# Patient Record
Sex: FEMALE | Race: WHITE | NOT HISPANIC OR LATINO | Employment: OTHER | URBAN - NONMETROPOLITAN AREA
[De-identification: names, ages, dates, MRNs, and addresses within clinical notes are randomized per-mention and may not be internally consistent; named-entity substitution may affect disease eponyms.]

---

## 2023-03-29 LAB
ALANINE AMINOTRANSFERASE (SGPT) (U/L) IN SER/PLAS: 18 U/L (ref 7–45)
ALBUMIN (G/DL) IN SER/PLAS: 4.6 G/DL (ref 3.4–5)
ALKALINE PHOSPHATASE (U/L) IN SER/PLAS: 123 U/L (ref 33–136)
ANION GAP IN SER/PLAS: 12 MMOL/L (ref 10–20)
ASPARTATE AMINOTRANSFERASE (SGOT) (U/L) IN SER/PLAS: 25 U/L (ref 9–39)
BASOPHILS (10*3/UL) IN BLOOD BY AUTOMATED COUNT: 0.03 X10E9/L (ref 0–0.1)
BASOPHILS/100 LEUKOCYTES IN BLOOD BY AUTOMATED COUNT: 0.4 % (ref 0–2)
BILIRUBIN TOTAL (MG/DL) IN SER/PLAS: 0.4 MG/DL (ref 0–1.2)
CALCIUM (MG/DL) IN SER/PLAS: 10.2 MG/DL (ref 8.6–10.3)
CARBON DIOXIDE, TOTAL (MMOL/L) IN SER/PLAS: 26 MMOL/L (ref 21–32)
CHLORIDE (MMOL/L) IN SER/PLAS: 106 MMOL/L (ref 98–107)
CREATININE (MG/DL) IN SER/PLAS: 0.95 MG/DL (ref 0.5–1.05)
EOSINOPHILS (10*3/UL) IN BLOOD BY AUTOMATED COUNT: 0.16 X10E9/L (ref 0–0.4)
EOSINOPHILS/100 LEUKOCYTES IN BLOOD BY AUTOMATED COUNT: 2.2 % (ref 0–6)
ERYTHROCYTE DISTRIBUTION WIDTH (RATIO) BY AUTOMATED COUNT: 12.7 % (ref 11.5–14.5)
ERYTHROCYTE MEAN CORPUSCULAR HEMOGLOBIN CONCENTRATION (G/DL) BY AUTOMATED: 32.7 G/DL (ref 32–36)
ERYTHROCYTE MEAN CORPUSCULAR VOLUME (FL) BY AUTOMATED COUNT: 91 FL (ref 80–100)
ERYTHROCYTES (10*6/UL) IN BLOOD BY AUTOMATED COUNT: 4.65 X10E12/L (ref 4–5.2)
GFR FEMALE: 61 ML/MIN/1.73M2
GLUCOSE (MG/DL) IN SER/PLAS: 82 MG/DL (ref 74–99)
HEMATOCRIT (%) IN BLOOD BY AUTOMATED COUNT: 42.2 % (ref 36–46)
HEMOGLOBIN (G/DL) IN BLOOD: 13.8 G/DL (ref 12–16)
IMMATURE GRANULOCYTES/100 LEUKOCYTES IN BLOOD BY AUTOMATED COUNT: 0.1 % (ref 0–0.9)
LEUKOCYTES (10*3/UL) IN BLOOD BY AUTOMATED COUNT: 7.4 X10E9/L (ref 4.4–11.3)
LYMPHOCYTES (10*3/UL) IN BLOOD BY AUTOMATED COUNT: 1.51 X10E9/L (ref 0.8–3)
LYMPHOCYTES/100 LEUKOCYTES IN BLOOD BY AUTOMATED COUNT: 20.3 % (ref 13–44)
MONOCYTES (10*3/UL) IN BLOOD BY AUTOMATED COUNT: 0.44 X10E9/L (ref 0.05–0.8)
MONOCYTES/100 LEUKOCYTES IN BLOOD BY AUTOMATED COUNT: 5.9 % (ref 2–10)
NEUTROPHILS (10*3/UL) IN BLOOD BY AUTOMATED COUNT: 5.28 X10E9/L (ref 1.6–5.5)
NEUTROPHILS/100 LEUKOCYTES IN BLOOD BY AUTOMATED COUNT: 71.1 % (ref 40–80)
PLATELETS (10*3/UL) IN BLOOD AUTOMATED COUNT: 153 X10E9/L (ref 150–450)
POTASSIUM (MMOL/L) IN SER/PLAS: 4.2 MMOL/L (ref 3.5–5.3)
PROTEIN TOTAL: 7.7 G/DL (ref 6.4–8.2)
SODIUM (MMOL/L) IN SER/PLAS: 140 MMOL/L (ref 136–145)
UREA NITROGEN (MG/DL) IN SER/PLAS: 23 MG/DL (ref 6–23)

## 2023-03-30 LAB
APPEARANCE, URINE: NORMAL
ASCORBIC ACID: NORMAL MG/DL
BILIRUBIN, URINE: NORMAL
BLOOD, URINE: NORMAL
COLOR, URINE: NORMAL
GLUCOSE, URINE: NORMAL
KETONES, URINE: NORMAL
LEUKOCYTE ESTERASE, URINE: NORMAL
NITRITE, URINE: NORMAL
PH, URINE: NORMAL
PROTEIN, URINE: NORMAL
SPECIFIC GRAVITY, URINE: NORMAL
UROBILINOGEN, URINE: NORMAL

## 2023-08-07 LAB
ALANINE AMINOTRANSFERASE (SGPT) (U/L) IN SER/PLAS: NORMAL
ALBUMIN (G/DL) IN SER/PLAS: NORMAL
ALKALINE PHOSPHATASE (U/L) IN SER/PLAS: NORMAL
ANION GAP IN SER/PLAS: NORMAL
ASPARTATE AMINOTRANSFERASE (SGOT) (U/L) IN SER/PLAS: NORMAL
BASOPHILS (10*3/UL) IN BLOOD BY AUTOMATED COUNT: NORMAL
BASOPHILS/100 LEUKOCYTES IN BLOOD BY AUTOMATED COUNT: NORMAL
BILIRUBIN TOTAL (MG/DL) IN SER/PLAS: NORMAL
CALCIUM (MG/DL) IN SER/PLAS: NORMAL
CARBON DIOXIDE, TOTAL (MMOL/L) IN SER/PLAS: NORMAL
CHLORIDE (MMOL/L) IN SER/PLAS: NORMAL
CREATININE (MG/DL) IN SER/PLAS: NORMAL
EOSINOPHILS (10*3/UL) IN BLOOD BY AUTOMATED COUNT: NORMAL
EOSINOPHILS/100 LEUKOCYTES IN BLOOD BY AUTOMATED COUNT: NORMAL
ERYTHROCYTE DISTRIBUTION WIDTH (RATIO) BY AUTOMATED COUNT: NORMAL
ERYTHROCYTE MEAN CORPUSCULAR HEMOGLOBIN CONCENTRATION (G/DL) BY AUTOMATED: NORMAL
ERYTHROCYTE MEAN CORPUSCULAR VOLUME (FL) BY AUTOMATED COUNT: NORMAL
ERYTHROCYTES (10*6/UL) IN BLOOD BY AUTOMATED COUNT: NORMAL
GFR FEMALE: NORMAL
GFR MALE: NORMAL
GLUCOSE (MG/DL) IN SER/PLAS: NORMAL
HEMATOCRIT (%) IN BLOOD BY AUTOMATED COUNT: NORMAL
HEMOGLOBIN (G/DL) IN BLOOD: NORMAL
IMMATURE GRANULOCYTES/100 LEUKOCYTES IN BLOOD BY AUTOMATED COUNT: NORMAL
LEUKOCYTES (10*3/UL) IN BLOOD BY AUTOMATED COUNT: NORMAL
LYMPHOCYTES (10*3/UL) IN BLOOD BY AUTOMATED COUNT: NORMAL
LYMPHOCYTES/100 LEUKOCYTES IN BLOOD BY AUTOMATED COUNT: NORMAL
MANUAL DIFFERENTIAL Y/N: NORMAL
MONOCYTES (10*3/UL) IN BLOOD BY AUTOMATED COUNT: NORMAL
MONOCYTES/100 LEUKOCYTES IN BLOOD BY AUTOMATED COUNT: NORMAL
NEUTROPHILS (10*3/UL) IN BLOOD BY AUTOMATED COUNT: NORMAL
NEUTROPHILS/100 LEUKOCYTES IN BLOOD BY AUTOMATED COUNT: NORMAL
PLATELETS (10*3/UL) IN BLOOD AUTOMATED COUNT: NORMAL
POTASSIUM (MMOL/L) IN SER/PLAS: NORMAL
PROTEIN TOTAL: NORMAL
SODIUM (MMOL/L) IN SER/PLAS: NORMAL
UREA NITROGEN (MG/DL) IN SER/PLAS: NORMAL

## 2023-10-04 ENCOUNTER — APPOINTMENT (OUTPATIENT)
Dept: PHYSICAL THERAPY | Facility: CLINIC | Age: 78
End: 2023-10-04
Payer: COMMERCIAL

## 2023-10-10 PROBLEM — R42 DYSEQUILIBRIUM: Status: ACTIVE | Noted: 2023-10-10

## 2023-10-10 PROBLEM — M25.819 SHOULDER IMPINGEMENT: Status: ACTIVE | Noted: 2023-10-10

## 2023-10-10 RX ORDER — SIMVASTATIN 10 MG/1
1 TABLET, FILM COATED ORAL NIGHTLY
COMMUNITY

## 2023-10-10 RX ORDER — LEVOTHYROXINE SODIUM 25 UG/1
1 TABLET ORAL DAILY
COMMUNITY

## 2023-10-10 RX ORDER — CLONAZEPAM 0.5 MG/1
1 TABLET ORAL 3 TIMES DAILY
COMMUNITY

## 2023-10-10 RX ORDER — SPIRONOLACTONE 25 MG
1 TABLET ORAL DAILY
COMMUNITY

## 2023-10-10 RX ORDER — TOPIRAMATE SPINKLE 25 MG/1
1 CAPSULE ORAL 2 TIMES DAILY
COMMUNITY

## 2023-10-10 RX ORDER — SERTRALINE HYDROCHLORIDE 100 MG/1
1 TABLET, FILM COATED ORAL DAILY
COMMUNITY

## 2023-10-10 RX ORDER — LATANOPROST 50 UG/ML
1 SOLUTION/ DROPS OPHTHALMIC NIGHTLY
COMMUNITY

## 2023-10-10 RX ORDER — SOTALOL HYDROCHLORIDE 80 MG/1
1 TABLET ORAL 2 TIMES DAILY
COMMUNITY

## 2023-10-10 RX ORDER — ARTIFICIAL TEARS 1; 2; 3 MG/ML; MG/ML; MG/ML
1 SOLUTION/ DROPS OPHTHALMIC 4 TIMES DAILY
COMMUNITY

## 2023-10-11 ENCOUNTER — TREATMENT (OUTPATIENT)
Dept: PHYSICAL THERAPY | Facility: CLINIC | Age: 78
End: 2023-10-11
Payer: COMMERCIAL

## 2023-10-11 DIAGNOSIS — M25.819 SHOULDER IMPINGEMENT: Primary | ICD-10-CM

## 2023-10-11 PROCEDURE — 97110 THERAPEUTIC EXERCISES: CPT | Mod: GP

## 2023-10-11 ASSESSMENT — PAIN SCALES - GENERAL: PAINLEVEL_OUTOF10: 0 - NO PAIN

## 2023-10-11 ASSESSMENT — PAIN - FUNCTIONAL ASSESSMENT: PAIN_FUNCTIONAL_ASSESSMENT: 0-10

## 2023-10-11 NOTE — PROGRESS NOTES
"Physical Therapy Treatment    Patient Name: Lorna Joaquin  MRN: [id number]  Today's Date: 10/11/2023  Time Calculation  Start Time: 1115  Stop Time: 1150  Time Calculation (min): 35 min    Current Problem  Problem List Items Addressed This Visit             ICD-10-CM    Shoulder impingement - Primary M25.819       Assessment:Patient identity confirmed today with name/.  See goals;  further PT intervention options were discussed in view of copay and indication for further exercise development;  good ADL /strength and AROM improvements noted;  full HEP recommendations have been completed      Plan: the patient agrees with discharge to ongoing HEP with progress achieved      Subjective: no pain right now.  I did have an incident of increased sx for 2 days-I tried to put up a curtain.    Precautions  Precautions  Precautions Comment: Fall Risk: low   Pertinent medical HX includes: imbalance; osteoporosis  begin with gentle ROM and progress as alf.    Pain  Pain Assessment: 0-10  Pain Score: 0 - No pain    Treatments:  Reviewed omgoing HEP and also wall slide with foam roll  NOT TODAY   wall slide with foam roll 2x10 1#  A+P ed X  UBE 2x90\" Lv1  stdg row purple 2x10 X  stdg B shldr ext 2x10 purple X  seated chest press 2x10 purple X  seated IR/ER 2x10 purple/teal X    alphabet x1 cycle 2# N  wall push-up x10      gradual stability exercises consistent with reduction of RUE numbness and tingling.    Provided today:. HEP handout.     OP EDUCATION:   Discussed ongoing sx management and indication for further PT intervention today    Goals:   1. Independent HEP to allow for 50% reduction in max ADL C/C sx ( 10/10) 10/10 max sx within the last 5 days; 10/11/23; NOT MET  2. 010 night time sx to allow for uninterrupted sleep x1wk ( ) 2-3wks  nights sleep interrupted; 10/11/23; PARTIALLY MET  3. Survey score improvement from 45% to 35% (QDI) 3-4wks 18% as of 10/11/23; MET  4. ROM increase to allow for improved ADL " reaching, dressing uppers (from 90 to 130 active elevation) 3-4wks notes ADL improvements; 115 active elevation today; 10/1/23; PARTIALLY MET  5. Strength increase to allow for improved ADL carrying, object handling (from gr4+ to gr5 R shldr) 3-4wks; notes ADL improvements;  gr5 R shldr IR/ER/abd; 10/11/23 MET

## 2023-11-06 ENCOUNTER — HOSPITAL ENCOUNTER (OUTPATIENT)
Dept: RADIOLOGY | Facility: HOSPITAL | Age: 78
Discharge: HOME | End: 2023-11-06
Payer: COMMERCIAL

## 2023-11-06 DIAGNOSIS — M25.819 OTHER SPECIFIED JOINT DISORDERS, UNSPECIFIED SHOULDER: ICD-10-CM

## 2023-11-06 DIAGNOSIS — S42.211D UNSPECIFIED DISPLACED FRACTURE OF SURGICAL NECK OF RIGHT HUMERUS, SUBSEQUENT ENCOUNTER FOR FRACTURE WITH ROUTINE HEALING: ICD-10-CM

## 2023-11-06 DIAGNOSIS — M25.511 PAIN IN RIGHT SHOULDER: ICD-10-CM

## 2023-11-06 PROCEDURE — 73020 X-RAY EXAM OF SHOULDER: CPT | Mod: RT

## 2023-11-06 PROCEDURE — 73020 X-RAY EXAM OF SHOULDER: CPT | Mod: RIGHT SIDE | Performed by: RADIOLOGY

## 2023-11-08 ENCOUNTER — OFFICE VISIT (OUTPATIENT)
Dept: ORTHOPEDIC SURGERY | Facility: CLINIC | Age: 78
End: 2023-11-08
Payer: COMMERCIAL

## 2023-11-08 DIAGNOSIS — M25.819 SHOULDER IMPINGEMENT: Primary | ICD-10-CM

## 2023-11-08 PROCEDURE — 1159F MED LIST DOCD IN RCRD: CPT | Performed by: NURSE PRACTITIONER

## 2023-11-08 PROCEDURE — 20611 DRAIN/INJ JOINT/BURSA W/US: CPT | Performed by: NURSE PRACTITIONER

## 2023-11-08 PROCEDURE — 1125F AMNT PAIN NOTED PAIN PRSNT: CPT | Performed by: NURSE PRACTITIONER

## 2023-11-08 PROCEDURE — 1160F RVW MEDS BY RX/DR IN RCRD: CPT | Performed by: NURSE PRACTITIONER

## 2023-11-08 PROCEDURE — 99214 OFFICE O/P EST MOD 30 MIN: CPT | Performed by: NURSE PRACTITIONER

## 2023-11-08 RX ORDER — ACETAMINOPHEN 500 MG
1000 TABLET ORAL 2 TIMES DAILY
COMMUNITY

## 2023-11-08 RX ORDER — CALCIUM CARBONATE 1250 MG/5ML
SUSPENSION ORAL
COMMUNITY

## 2023-11-08 RX ORDER — DICLOFENAC SODIUM 10 MG/G
2 GEL TOPICAL 4 TIMES DAILY PRN
Qty: 100 G | Refills: 1 | Status: SHIPPED | OUTPATIENT
Start: 2023-11-08

## 2023-11-08 RX ORDER — NAPROXEN SODIUM 220 MG/1
81 TABLET, FILM COATED ORAL
COMMUNITY

## 2023-11-08 RX ORDER — IBUPROFEN 600 MG/1
600 TABLET ORAL EVERY 6 HOURS PRN
COMMUNITY
Start: 2015-09-17

## 2023-11-08 RX ORDER — ALENDRONATE SODIUM 70 MG/1
70 TABLET ORAL
COMMUNITY
Start: 2023-04-05

## 2023-11-08 RX ORDER — TRIAMCINOLONE ACETONIDE 40 MG/ML
40 INJECTION, SUSPENSION INTRA-ARTICULAR; INTRAMUSCULAR
Status: COMPLETED | OUTPATIENT
Start: 2023-11-08 | End: 2023-11-08

## 2023-11-08 RX ORDER — CHOLECALCIFEROL (VITAMIN D3) 50 MCG
TABLET ORAL
COMMUNITY

## 2023-11-08 RX ORDER — CELECOXIB 200 MG/1
CAPSULE ORAL
COMMUNITY
Start: 2023-07-30

## 2023-11-08 RX ORDER — MELOXICAM 15 MG/1
15 TABLET ORAL DAILY
Qty: 30 TABLET | Refills: 1 | Status: SHIPPED | OUTPATIENT
Start: 2023-11-08 | End: 2024-01-02

## 2023-11-08 RX ADMIN — TRIAMCINOLONE ACETONIDE 40 MG: 40 INJECTION, SUSPENSION INTRA-ARTICULAR; INTRAMUSCULAR at 11:32

## 2023-11-08 ASSESSMENT — ENCOUNTER SYMPTOMS
ENDOCRINE NEGATIVE: 1
CONSTITUTIONAL NEGATIVE: 1
RESPIRATORY NEGATIVE: 1
CARDIOVASCULAR NEGATIVE: 1
HEMATOLOGIC/LYMPHATIC NEGATIVE: 1
ARTHRALGIAS: 1
NEUROLOGICAL NEGATIVE: 1
PSYCHIATRIC NEGATIVE: 1

## 2023-11-08 ASSESSMENT — PAIN SCALES - GENERAL: PAINLEVEL_OUTOF10: 2

## 2023-11-08 ASSESSMENT — PAIN DESCRIPTION - DESCRIPTORS: DESCRIPTORS: ACHING

## 2023-11-08 ASSESSMENT — PAIN - FUNCTIONAL ASSESSMENT: PAIN_FUNCTIONAL_ASSESSMENT: 0-10

## 2023-11-08 NOTE — PROGRESS NOTES
"Subjective    Patient ID: Lorna Joaquin is a 78 y.o. female.    Chief Complaint: Follow-up and Pain of the Right Shoulder       Right Shoulder   Lorna is a pleasant 78-year-old female presenting today for recheck of right shoulder pain.  Still has some pain, some days are good, other day s\"I sit in a chair and sit and cry.\"   Out of PT x 3 weeks, HEP every other day. No pain with HEP.   Scheduled 1 500 Tyl in am, noon 1 Celebrex, a second dose in the afternoon afternoon and bedtime tyl  On Celebrex since March, 2023.   Fx August 2022  Only relief so far was cortisone inj to shoulder that lasted \"quite a while.\" (8/2023) - lasted about 2 months  No topicals attempted  Had been cleaning kitchen and moving heavy dishes overhead and may have aggravated sx.    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    HENT: Negative.     Respiratory: Negative.     Cardiovascular: Negative.    Endocrine: Negative.    Musculoskeletal:  Positive for arthralgias.   Skin: Negative.    Neurological: Negative.    Hematological: Negative.    Psychiatric/Behavioral: Negative.         Objective   Ortho Exam    Image Results:  XR shoulder right 1 view  Narrative: Interpreted By:  Nic Faust,   STUDY:  XR SHOULDER RIGHT 1 VIEW; ;  11/6/2023 12:09 pm      INDICATION:  Signs/Symptoms: fx f/u  M25.511: Right shoulder pain S42.211D:  Fracture of surgical neck of right humerus with routine healing  M25.819: Shoulder impingement.      COMPARISON:  08/07/2020      ACCESSION NUMBER(S):  [id number]      ORDERING CLINICIAN:  SARAHI ORTEGA      FINDINGS:  RIGHT SHOULDER AP NEUTRAL:  A healed fracture of the surgical neck of the humerus is seen with  impaction and slight displacement. There is no change in position  from 08/07/2023. No further increase in callus formation.  Degenerative changes are seen at the AC joint. An old healed fracture  of the distal clavicle is present.      Impression: Stable " findings from the previous examination, with healed impacted  fracture of the surgical neck of the humerus.      MACRO:  None      Signed by: Nic Faust 11/7/2023 3:51 PM  Dictation workstation:   [id number]    Patient ID: Lorna Joaquin is a 78 y.o. female.    L Inj/Asp: R subacromial bursa on 11/8/2023 11:32 AM  Indications: pain and joint swelling  Details: 22 G needle, ultrasound-guided posterior approach  Medications: 40 mg triamcinolone acetonide 40 mg/mL  Outcome: tolerated well, no immediate complications    We discussed risk and benefits of cortisone injection, patient wishes to proceed via verbal consent.  Skin was prepped with Betadine, Vapocoolant spray and alcohol.  Direct visualization using high-frequency linear probe of ultrasound was utilized to administer the injection of 40 mg Kenalog, 3 cc 1% lidocaine and 3 cc of bupivacaine.  Patient tolerated injection well lidocaine suppression.  Images were saved under MRN number into the PACS system.     Procedure, treatment alternatives, risks and benefits explained, specific risks discussed. Consent was given by the patient. Immediately prior to procedure a time out was called to verify the correct patient, procedure, equipment, support staff and site/side marked as required. Patient was prepped and draped in the usual sterile fashion.     Note: Patient voiced concern with safety and care of spouse with dementia.  Patient states she is safe within her home environment.  She does have a  she has reached out to in the past.  Both the patient and son have verbalized concern of the spouse driving.  I identified multiple resources for the patient to contact her , her 's PCP or neurologist regarding concern for cognition.  Reassurance given.    Assessment/Plan   Encounter Diagnoses:  Problem List Items Addressed This Visit             ICD-10-CM    Shoulder impingement - Primary M25.819    Relevant Medications     diclofenac sodium (Voltaren) 1 % gel gel    meloxicam (Mobic) 15 mg tablet    Other Relevant Orders    Point of Care Ultrasound (Completed)    Follow Up In Orthopaedic Surgery    Shoulder impingement  We discussed symptom control with OTC Tylenol and topical pain relievers. Patient has been on prescription Celebrex with no significant improvements in symptoms since earlier this year.  Patient has approximately 2 weeks of tablets remaining.  After that, we are going to trial meloxicam once daily and to DC the Celebrex.  nstructed on 2 grams diclofenac gel 4 times daily.  Patient may also use heat or ice, whichever offers better relief.  Patient tolerated cortisone injection well with positive lidocaine suppression at today's visit.  I encouraged light activity today and gradually resume normal activities over the next several days.  Beginning in 1 week, I am providing shoulder OA exercises to begin and advance as tolerated.  Plan will be to follow-up here in approximately 3 to 4 weeks, sooner for changes or concerns.  Patient in agreement with plan of care.  This note was generated using Dragon software.  It may contain errors in wording, punctuation or spelling.

## 2023-11-08 NOTE — ASSESSMENT & PLAN NOTE
We discussed symptom control with OTC Tylenol and topical pain relievers. Patient has been on prescription Celebrex with no significant improvements in symptoms since earlier this year.  Patient has approximately 2 weeks of tablets remaining.  After that, we are going to trial meloxicam once daily and to DC the Celebrex.  nstructed on 2 grams diclofenac gel 4 times daily.  Patient may also use heat or ice, whichever offers better relief.  Patient tolerated cortisone injection well with positive lidocaine suppression at today's visit.  I encouraged light activity today and gradually resume normal activities over the next several days.  Beginning in 1 week, I am providing shoulder OA exercises to begin and advance as tolerated.  Plan will be to follow-up here in approximately 3 to 4 weeks, sooner for changes or concerns.  Patient in agreement with plan of care.  This note was generated using Dragon software.  It may contain errors in wording, punctuation or spelling.

## 2023-11-16 ENCOUNTER — TELEPHONE (OUTPATIENT)
Dept: ORTHOPEDIC SURGERY | Facility: CLINIC | Age: 78
End: 2023-11-16
Payer: COMMERCIAL

## 2023-11-16 NOTE — TELEPHONE ENCOUNTER
LAUREN WITH [facility] CALLED AND STATED THAT THE DICLOFENAC GEL THAT WAS SENT IN WAS NOT SPECIFIC ON THE DIRECTIONS FOR A HALF DOSE. SHE WOULD LIKE YOU TO CALL BACK WITH THE CORRECT DOSING. [phone] OPTION 2  REF# [id number].

## 2023-12-18 ENCOUNTER — APPOINTMENT (OUTPATIENT)
Dept: ORTHOPEDIC SURGERY | Facility: CLINIC | Age: 78
End: 2023-12-18
Payer: COMMERCIAL

## 2023-12-18 PROBLEM — E78.00 PURE HYPERCHOLESTEROLEMIA: Status: ACTIVE | Noted: 2017-04-17
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
## 2023-12-18 PROBLEM — R15.9 FECAL INCONTINENCE: Status: ACTIVE | Noted: 2023-03-29

## 2023-12-18 PROBLEM — I49.9 IRREGULAR HEARTBEAT: Status: ACTIVE | Noted: 2023-12-18

## 2023-12-18 PROBLEM — F32.A DEPRESSION: Status: ACTIVE | Noted: 2023-12-18

## 2023-12-18 PROBLEM — I50.9 CONGESTIVE HEART FAILURE (MULTI): Status: ACTIVE | Noted: 2022-01-17

## 2023-12-18 PROBLEM — K52.9 CHRONIC DIARRHEA: Status: ACTIVE | Noted: 2022-08-02

## 2023-12-18 PROBLEM — R63.4 WEIGHT LOSS: Status: ACTIVE | Noted: 2022-08-02

## 2023-12-18 PROBLEM — R29.6 RECURRENT FALLS: Status: ACTIVE | Noted: 2018-08-03

## 2023-12-18 PROBLEM — I10 HYPERTENSION: Status: ACTIVE | Noted: 2019-01-27

## 2023-12-18 PROBLEM — H74.03 TYMPANOSCLEROSIS OF BOTH EARS: Status: ACTIVE | Noted: 2018-06-07

## 2023-12-18 RX ORDER — PROPYLENE GLYCOL 0.06 MG/ML
1 EMULSION OPHTHALMIC
COMMUNITY

## 2023-12-18 RX ORDER — BUPROPION HYDROCHLORIDE 300 MG/1
TABLET ORAL
COMMUNITY
Start: 2023-11-09

## 2023-12-18 RX ORDER — MORPHINE 10 MG/ML
0.3 TINCTURE ORAL EVERY 6 HOURS PRN
COMMUNITY
Start: 2011-06-03

## 2023-12-19 ENCOUNTER — OFFICE VISIT (OUTPATIENT)
Dept: ORTHOPEDIC SURGERY | Facility: CLINIC | Age: 78
End: 2023-12-19
Payer: COMMERCIAL

## 2023-12-19 DIAGNOSIS — M25.819 SHOULDER IMPINGEMENT: ICD-10-CM

## 2023-12-19 PROCEDURE — 99213 OFFICE O/P EST LOW 20 MIN: CPT | Performed by: NURSE PRACTITIONER

## 2023-12-19 PROCEDURE — 1036F TOBACCO NON-USER: CPT | Performed by: NURSE PRACTITIONER

## 2023-12-19 PROCEDURE — 1160F RVW MEDS BY RX/DR IN RCRD: CPT | Performed by: NURSE PRACTITIONER

## 2023-12-19 PROCEDURE — 1126F AMNT PAIN NOTED NONE PRSNT: CPT | Performed by: NURSE PRACTITIONER

## 2023-12-19 PROCEDURE — 1159F MED LIST DOCD IN RCRD: CPT | Performed by: NURSE PRACTITIONER

## 2023-12-19 ASSESSMENT — PATIENT HEALTH QUESTIONNAIRE - PHQ9
2. FEELING DOWN, DEPRESSED OR HOPELESS: NOT AT ALL
SUM OF ALL RESPONSES TO PHQ9 QUESTIONS 1 AND 2: 0
1. LITTLE INTEREST OR PLEASURE IN DOING THINGS: NOT AT ALL

## 2023-12-19 ASSESSMENT — ENCOUNTER SYMPTOMS
PSYCHIATRIC NEGATIVE: 1
NEUROLOGICAL NEGATIVE: 1
CONSTITUTIONAL NEGATIVE: 1
ENDOCRINE NEGATIVE: 1
RESPIRATORY NEGATIVE: 1
CARDIOVASCULAR NEGATIVE: 1
HEMATOLOGIC/LYMPHATIC NEGATIVE: 1
ARTHRALGIAS: 1

## 2023-12-19 ASSESSMENT — PAIN - FUNCTIONAL ASSESSMENT: PAIN_FUNCTIONAL_ASSESSMENT: 0-10

## 2023-12-19 ASSESSMENT — PAIN SCALES - GENERAL: PAINLEVEL_OUTOF10: 0 - NO PAIN

## 2023-12-19 NOTE — ASSESSMENT & PLAN NOTE
We discussed symptom control with OTC Tylenol and topical pain relievers. Instructed on 2 grams diclofenac gel 4 times daily.  Patient may also use heat or ice, whichever offers better relief. Continue shoulder OA exercises to begin and advance as tolerated, try for most days of the week.  Plan will be to follow-up here on prn basis for sx flare, changes or concerns. Patient in agreement with plan of care.  This note was generated using Dragon software.  It may contain errors in wording, punctuation or spelling.     Note: F/U concern with safety and care of spouse with dementia.  Patient has been in contact with her  and son.  Spouse is due for cognitive testing next week including ability to safely drive.  Patient declines any safety concerns within her home and has safe resources.  Reassurance given.

## 2023-12-19 NOTE — PROGRESS NOTES
Subjective    Patient ID: Lorna Joaquin is a 78 y.o. female.    Chief Complaint: Pain and Follow-up of the Right Shoulder       Right Shoulder     Lorna is a pleasant 78-year-old female presenting today for recheck of right shoulder pain. Good releif after ariadna at last visit, big improvements in ROM and strength  Aggravated with overuse and prolonged baking activity (12 hrs) Improved after day of rest.   Has been able to resume most household chores and ADLs well.  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    HENT: Negative.     Respiratory: Negative.     Cardiovascular: Negative.    Endocrine: Negative.    Musculoskeletal:  Positive for arthralgias.   Skin: Negative.    Neurological: Negative.    Hematological: Negative.    Psychiatric/Behavioral: Negative.         Objective   Right Shoulder Exam     Tenderness   Right shoulder tenderness location: mild anterior upper arm with abduction.    Range of Motion   Active abduction:  120   External rotation:  40   Forward flexion:  170     Muscle Strength   Abduction: 4/5   External rotation: 4/5   Biceps: 5/5     Tests   Almendarez test: negative  Cross arm: positive  Drop arm: negative  Sulcus: absent    Other   Erythema: absent  Sensation: normal  Pulse: present    Comments:  Full ROM of distal joints with no sx aggravation, distal motor and sensory intact, cap refill at 2 seconds.          Image Results:  XR shoulder right 1 view  Narrative: Interpreted By:  Nic Faust,   STUDY:  XR SHOULDER RIGHT 1 VIEW; ;  11/6/2023 12:09 pm      INDICATION:  Signs/Symptoms: fx f/u  M25.511: Right shoulder pain S42.211D:  Fracture of surgical neck of right humerus with routine healing  M25.819: Shoulder impingement.      COMPARISON:  08/07/2020      ACCESSION NUMBER(S):  [id number]      ORDERING CLINICIAN:  SARAHI ORTEGA      FINDINGS:  RIGHT SHOULDER AP NEUTRAL:  A healed  fracture of the surgical neck of the humerus is seen with  impaction and slight displacement. There is no change in position  from 08/07/2023. No further increase in callus formation.  Degenerative changes are seen at the AC joint. An old healed fracture  of the distal clavicle is present.      Impression: Stable findings from the previous examination, with healed impacted  fracture of the surgical neck of the humerus.      MACRO:  None      Signed by: Nic Faust 11/7/2023 3:51 PM  Dictation workstation:   [id number]          Assessment/Plan   Encounter Diagnoses:  Problem List Items Addressed This Visit             ICD-10-CM    Shoulder impingement M25.819     Problem List Items Addressed This Visit             ICD-10-CM    Shoulder impingement M25.819     We discussed symptom control with OTC Tylenol and topical pain relievers. Instructed on 2 grams diclofenac gel 4 times daily.  Patient may also use heat or ice, whichever offers better relief. Continue shoulder OA exercises to begin and advance as tolerated, try for most days of the week.  Plan will be to follow-up here on prn basis for sx flare, changes or concerns. Patient in agreement with plan of care.  This note was generated using Dragon software.  It may contain errors in wording, punctuation or spelling.     Note: F/U concern with safety and care of spouse with dementia.  Patient has been in contact with her  and son.  Spouse is due for cognitive testing next week including ability to safely drive.  Patient declines any safety concerns within her home and has safe resources.  Reassurance given.          Relevant Orders    Follow Up In Orthopaedic Surgery

## 2024-07-18 ENCOUNTER — HOSPITAL ENCOUNTER (OUTPATIENT)
Dept: RADIOLOGY | Facility: CLINIC | Age: 79
Discharge: HOME | End: 2024-07-18
Payer: COMMERCIAL

## 2024-07-18 ENCOUNTER — APPOINTMENT (OUTPATIENT)
Dept: ORTHOPEDIC SURGERY | Facility: CLINIC | Age: 79
End: 2024-07-18
Payer: COMMERCIAL

## 2024-07-18 DIAGNOSIS — M25.512 LEFT SHOULDER PAIN, UNSPECIFIED CHRONICITY: ICD-10-CM

## 2024-07-18 DIAGNOSIS — M25.819 SHOULDER IMPINGEMENT: Primary | ICD-10-CM

## 2024-07-18 PROCEDURE — 1160F RVW MEDS BY RX/DR IN RCRD: CPT | Performed by: NURSE PRACTITIONER

## 2024-07-18 PROCEDURE — 73030 X-RAY EXAM OF SHOULDER: CPT | Mod: LT

## 2024-07-18 PROCEDURE — 99214 OFFICE O/P EST MOD 30 MIN: CPT | Performed by: NURSE PRACTITIONER

## 2024-07-18 PROCEDURE — 73030 X-RAY EXAM OF SHOULDER: CPT | Mod: LEFT SIDE | Performed by: RADIOLOGY

## 2024-07-18 PROCEDURE — 1159F MED LIST DOCD IN RCRD: CPT | Performed by: NURSE PRACTITIONER

## 2024-07-18 ASSESSMENT — PAIN SCALES - GENERAL: PAINLEVEL_OUTOF10: 10 - WORST POSSIBLE PAIN

## 2024-07-18 ASSESSMENT — ENCOUNTER SYMPTOMS
ENDOCRINE NEGATIVE: 1
HEMATOLOGIC/LYMPHATIC NEGATIVE: 1
PSYCHIATRIC NEGATIVE: 1
ARTHRALGIAS: 1
CONSTITUTIONAL NEGATIVE: 1
NEUROLOGICAL NEGATIVE: 1
RESPIRATORY NEGATIVE: 1
CARDIOVASCULAR NEGATIVE: 1

## 2024-07-18 ASSESSMENT — PAIN - FUNCTIONAL ASSESSMENT: PAIN_FUNCTIONAL_ASSESSMENT: 0-10

## 2024-07-18 ASSESSMENT — PAIN DESCRIPTION - DESCRIPTORS: DESCRIPTORS: BURNING;ACHING

## 2024-07-18 NOTE — PROGRESS NOTES
Subjective    Patient ID: Lorna Joaquin is a 79 y.o. female.    Chief Complaint:   Chief Complaint   Patient presents with    Left Shoulder - Pain        HPI    Lorna is a 79-year-old female presenting today for new problem visit of left shoulder pain.  Sx for 3-4 months. Sx started after frequent overhead use cleaning out kitchen cupboards in process of remodeling.   3-4 falls in last yr, none directly acting shoulder, however has caught self against walls several times as well in near falls.   Unable to sleep on L side, sx aggravated after sleeping  Takes 2 Tylenol twice daily, Celebrex and Fosamax. Voltaren gel once daily  RH dominant.  Hx R humerus surgical neck fx 2022.    Review of Systems   Constitutional: Negative.    HENT: Negative.     Respiratory: Negative.     Cardiovascular: Negative.    Endocrine: Negative.    Musculoskeletal:  Positive for arthralgias.   Skin: Negative.    Neurological: Negative.    Hematological: Negative.    Psychiatric/Behavioral: Negative.        Objective   Left Shoulder Exam     Tenderness   The patient is experiencing tenderness in the acromioclavicular joint and biceps tendon.    Range of Motion   Active abduction:  90   Forward flexion:  140     Tests   Impingement: positive    Other   Erythema: absent  Sensation: normal  Pulse: present     Comments:  Positive Neer's testing, positive Speed test.  Full ROM of distal joints with no sx aggravation; distal motor and sensory intact, cap refill at 2 seconds.            Image Results:  === 07/18/24 ===    XR SHOULDER 2+ VIEWS LEFT    - Impression -  1. Mild acromioclavicular joint osteoarthrosis. Otherwise,  unremarkable left shoulder radiographs.    MACRO:    None.    Signed by: Rufina Vega 7/19/2024 7:47 PM  Dictation workstation:   [id number]     Independent review of left shoulder x-rays completed during today's visit.  Positive mild to moderate AC joint degenerative changes noted.  No acute fracture or misalignment  noted.  Patient ID: Lorna Joaquin is a 79 y.o. female.    L Inj/Asp: L subacromial bursa on 7/25/2024 11:25 AM  Indications: pain and joint swelling  Details: 22 G needle, posterior approach  Medications: 40 mg triamcinolone acetonide 40 mg/mL  Outcome: tolerated well, no immediate complications    We discussed risk and benefits of cortisone injection, patient wishes to proceed via verbal consent.  Skin was prepped with Betadine, vapo coolant spray and alcohol.  Administered injection of 40 mg Kenalog, 3 cc 1% lidocaine and 3 cc of 0.25% bupivacaine.  Patient tolerated injection well with lidocaine suppression.  No active bleeding, bandage applied to site.   Procedure, treatment alternatives, risks and benefits explained, specific risks discussed. Consent was given by the patient. Immediately prior to procedure a time out was called to verify the correct patient, procedure, equipment, support staff and site/side marked as required. Patient was prepped and draped in the usual sterile fashion.         Assessment/Plan   Encounter Diagnoses:  Problem List Items Addressed This Visit             ICD-10-CM    Shoulder impingement - Primary M25.819     We discussed symptom control with OTC Tylenol and topical pain relievers.  NSAID as previously prescribed on as needed basis with food.  Instructed on 2 grams diclofenac gel 4 times daily.  Patient may also use heat or ice, whichever offers better relief.    I am providing rotator cuff exercises to begin in approximately 1 week and advance as tolerated.  Plan will be to follow-up here in approximately 6 to 8 weeks weeks, sooner for changes or concerns.   Declines order for formal PT at this time  Patient in agreement with plan of care.  This note was generated using Dragon software.  It may contain errors in wording, punctuation or spelling.          Relevant Orders    Follow Up In Orthopaedic Surgery     Other Visit Diagnoses         Codes    Left shoulder pain,  unspecified chronicity     M25.512    Relevant Orders    XR shoulder left 2+ views (Completed)    Referral to Primary Care

## 2024-07-25 PROCEDURE — 20610 DRAIN/INJ JOINT/BURSA W/O US: CPT | Performed by: NURSE PRACTITIONER

## 2024-07-25 RX ORDER — TRIAMCINOLONE ACETONIDE 40 MG/ML
40 INJECTION, SUSPENSION INTRA-ARTICULAR; INTRAMUSCULAR
Status: COMPLETED | OUTPATIENT
Start: 2024-07-25 | End: 2024-07-25

## 2024-07-25 NOTE — ASSESSMENT & PLAN NOTE
We discussed symptom control with OTC Tylenol and topical pain relievers.  NSAID as previously prescribed on as needed basis with food.  Instructed on 2 grams diclofenac gel 4 times daily.  Patient may also use heat or ice, whichever offers better relief.    I am providing rotator cuff exercises to begin in approximately 1 week and advance as tolerated.  Plan will be to follow-up here in approximately 6 to 8 weeks weeks, sooner for changes or concerns.   Declines order for formal PT at this time  Patient in agreement with plan of care.  This note was generated using Dragon software.  It may contain errors in wording, punctuation or spelling.

## 2024-08-29 ENCOUNTER — APPOINTMENT (OUTPATIENT)
Dept: ORTHOPEDIC SURGERY | Facility: CLINIC | Age: 79
End: 2024-08-29
Payer: COMMERCIAL

## 2024-09-12 ENCOUNTER — HOSPITAL ENCOUNTER (EMERGENCY)
Facility: HOSPITAL | Age: 79
Discharge: HOME | End: 2024-09-12
Payer: COMMERCIAL

## 2024-09-12 ENCOUNTER — APPOINTMENT (OUTPATIENT)
Dept: RADIOLOGY | Facility: HOSPITAL | Age: 79
End: 2024-09-12
Payer: COMMERCIAL

## 2024-09-12 ENCOUNTER — HOSPITAL ENCOUNTER (OUTPATIENT)
Dept: CARDIOLOGY | Facility: HOSPITAL | Age: 79
Discharge: HOME | End: 2024-09-12
Payer: COMMERCIAL

## 2024-09-12 ENCOUNTER — APPOINTMENT (OUTPATIENT)
Dept: CARDIOLOGY | Facility: HOSPITAL | Age: 79
End: 2024-09-12
Payer: COMMERCIAL

## 2024-09-12 VITALS
RESPIRATION RATE: 17 BRPM | HEART RATE: 75 BPM | TEMPERATURE: 97.8 F | BODY MASS INDEX: 19.46 KG/M2 | HEIGHT: 64 IN | WEIGHT: 114 LBS | DIASTOLIC BLOOD PRESSURE: 68 MMHG | OXYGEN SATURATION: 98 % | SYSTOLIC BLOOD PRESSURE: 150 MMHG

## 2024-09-12 DIAGNOSIS — S32.020A COMPRESSION FRACTURE OF L2 VERTEBRA, INITIAL ENCOUNTER (MULTI): ICD-10-CM

## 2024-09-12 DIAGNOSIS — R19.7 DIARRHEA, UNSPECIFIED TYPE: ICD-10-CM

## 2024-09-12 DIAGNOSIS — R10.84 GENERALIZED ABDOMINAL PAIN: Primary | ICD-10-CM

## 2024-09-12 DIAGNOSIS — K76.6 PORTAL HYPERTENSION (MULTI): ICD-10-CM

## 2024-09-12 LAB
ALBUMIN SERPL BCP-MCNC: 4.1 G/DL (ref 3.4–5)
ALP SERPL-CCNC: 114 U/L (ref 33–136)
ALT SERPL W P-5'-P-CCNC: 10 U/L (ref 7–45)
ANION GAP SERPL CALC-SCNC: 11 MMOL/L (ref 10–20)
APPEARANCE UR: CLEAR
APTT PPP: 33 SECONDS (ref 27–38)
AST SERPL W P-5'-P-CCNC: 17 U/L (ref 9–39)
BASOPHILS # BLD AUTO: 0.02 X10*3/UL (ref 0–0.1)
BASOPHILS NFR BLD AUTO: 0.3 %
BILIRUB SERPL-MCNC: 0.5 MG/DL (ref 0–1.2)
BILIRUB UR STRIP.AUTO-MCNC: NEGATIVE MG/DL
BUN SERPL-MCNC: 23 MG/DL (ref 6–23)
CALCIUM SERPL-MCNC: 9.3 MG/DL (ref 8.6–10.3)
CARDIAC TROPONIN I PNL SERPL HS: 8 NG/L (ref 0–13)
CARDIAC TROPONIN I PNL SERPL HS: 8 NG/L (ref 0–13)
CHLORIDE SERPL-SCNC: 109 MMOL/L (ref 98–107)
CO2 SERPL-SCNC: 24 MMOL/L (ref 21–32)
COLOR UR: ABNORMAL
CREAT SERPL-MCNC: 1.03 MG/DL (ref 0.5–1.05)
EGFRCR SERPLBLD CKD-EPI 2021: 55 ML/MIN/1.73M*2
EOSINOPHIL # BLD AUTO: 0.25 X10*3/UL (ref 0–0.4)
EOSINOPHIL NFR BLD AUTO: 4.3 %
ERYTHROCYTE [DISTWIDTH] IN BLOOD BY AUTOMATED COUNT: 13.2 % (ref 11.5–14.5)
GLUCOSE SERPL-MCNC: 93 MG/DL (ref 74–99)
GLUCOSE UR STRIP.AUTO-MCNC: NORMAL MG/DL
HCT VFR BLD AUTO: 35.1 % (ref 36–46)
HGB BLD-MCNC: 11.3 G/DL (ref 12–16)
IMM GRANULOCYTES # BLD AUTO: 0.01 X10*3/UL (ref 0–0.5)
IMM GRANULOCYTES NFR BLD AUTO: 0.2 % (ref 0–0.9)
INR PPP: 1.1 (ref 0.9–1.1)
KETONES UR STRIP.AUTO-MCNC: NEGATIVE MG/DL
LEUKOCYTE ESTERASE UR QL STRIP.AUTO: ABNORMAL
LIPASE SERPL-CCNC: 44 U/L (ref 9–82)
LYMPHOCYTES # BLD AUTO: 1.22 X10*3/UL (ref 0.8–3)
LYMPHOCYTES NFR BLD AUTO: 20.8 %
MAGNESIUM SERPL-MCNC: 1.89 MG/DL (ref 1.6–2.4)
MCH RBC QN AUTO: 29.9 PG (ref 26–34)
MCHC RBC AUTO-ENTMCNC: 32.2 G/DL (ref 32–36)
MCV RBC AUTO: 93 FL (ref 80–100)
MONOCYTES # BLD AUTO: 0.46 X10*3/UL (ref 0.05–0.8)
MONOCYTES NFR BLD AUTO: 7.8 %
NEUTROPHILS # BLD AUTO: 3.9 X10*3/UL (ref 1.6–5.5)
NEUTROPHILS NFR BLD AUTO: 66.6 %
NITRITE UR QL STRIP.AUTO: NEGATIVE
NRBC BLD-RTO: 0 /100 WBCS (ref 0–0)
PH UR STRIP.AUTO: 7 [PH]
PLATELET # BLD AUTO: 136 X10*3/UL (ref 150–450)
POTASSIUM SERPL-SCNC: 4.1 MMOL/L (ref 3.5–5.3)
PROT SERPL-MCNC: 6.7 G/DL (ref 6.4–8.2)
PROT UR STRIP.AUTO-MCNC: NEGATIVE MG/DL
PROTHROMBIN TIME: 12.5 SECONDS (ref 9.8–12.8)
RBC # BLD AUTO: 3.78 X10*6/UL (ref 4–5.2)
RBC # UR STRIP.AUTO: NEGATIVE /UL
RBC #/AREA URNS AUTO: ABNORMAL /HPF
SARS-COV-2 RNA RESP QL NAA+PROBE: NOT DETECTED
SODIUM SERPL-SCNC: 140 MMOL/L (ref 136–145)
SP GR UR STRIP.AUTO: 1.02
SQUAMOUS #/AREA URNS AUTO: ABNORMAL /HPF
UROBILINOGEN UR STRIP.AUTO-MCNC: NORMAL MG/DL
WBC # BLD AUTO: 5.9 X10*3/UL (ref 4.4–11.3)
WBC #/AREA URNS AUTO: ABNORMAL /HPF

## 2024-09-12 PROCEDURE — 99285 EMERGENCY DEPT VISIT HI MDM: CPT | Mod: 25

## 2024-09-12 PROCEDURE — 84484 ASSAY OF TROPONIN QUANT: CPT | Performed by: PHYSICIAN ASSISTANT

## 2024-09-12 PROCEDURE — 93005 ELECTROCARDIOGRAM TRACING: CPT

## 2024-09-12 PROCEDURE — 74177 CT ABD & PELVIS W/CONTRAST: CPT

## 2024-09-12 PROCEDURE — 2550000001 HC RX 255 CONTRASTS: Performed by: PHYSICIAN ASSISTANT

## 2024-09-12 PROCEDURE — 81001 URINALYSIS AUTO W/SCOPE: CPT | Performed by: PHYSICIAN ASSISTANT

## 2024-09-12 PROCEDURE — 74177 CT ABD & PELVIS W/CONTRAST: CPT | Performed by: STUDENT IN AN ORGANIZED HEALTH CARE EDUCATION/TRAINING PROGRAM

## 2024-09-12 PROCEDURE — 83735 ASSAY OF MAGNESIUM: CPT | Performed by: PHYSICIAN ASSISTANT

## 2024-09-12 PROCEDURE — 85610 PROTHROMBIN TIME: CPT | Performed by: PHYSICIAN ASSISTANT

## 2024-09-12 PROCEDURE — 85730 THROMBOPLASTIN TIME PARTIAL: CPT | Performed by: PHYSICIAN ASSISTANT

## 2024-09-12 PROCEDURE — 87086 URINE CULTURE/COLONY COUNT: CPT | Mod: SAMLAB | Performed by: PHYSICIAN ASSISTANT

## 2024-09-12 PROCEDURE — 80053 COMPREHEN METABOLIC PANEL: CPT | Performed by: PHYSICIAN ASSISTANT

## 2024-09-12 PROCEDURE — 36415 COLL VENOUS BLD VENIPUNCTURE: CPT | Performed by: PHYSICIAN ASSISTANT

## 2024-09-12 PROCEDURE — 85025 COMPLETE CBC W/AUTO DIFF WBC: CPT | Performed by: PHYSICIAN ASSISTANT

## 2024-09-12 PROCEDURE — 71045 X-RAY EXAM CHEST 1 VIEW: CPT | Performed by: RADIOLOGY

## 2024-09-12 PROCEDURE — 2500000004 HC RX 250 GENERAL PHARMACY W/ HCPCS (ALT 636 FOR OP/ED): Performed by: PHYSICIAN ASSISTANT

## 2024-09-12 PROCEDURE — 71045 X-RAY EXAM CHEST 1 VIEW: CPT

## 2024-09-12 PROCEDURE — 83690 ASSAY OF LIPASE: CPT | Performed by: PHYSICIAN ASSISTANT

## 2024-09-12 PROCEDURE — 87635 SARS-COV-2 COVID-19 AMP PRB: CPT | Performed by: PHYSICIAN ASSISTANT

## 2024-09-12 PROCEDURE — 96360 HYDRATION IV INFUSION INIT: CPT

## 2024-09-12 ASSESSMENT — ENCOUNTER SYMPTOMS
COLOR CHANGE: 0
FEVER: 0
SORE THROAT: 0
CONSTIPATION: 0
HEMATURIA: 0
ABDOMINAL PAIN: 1
WHEEZING: 0
COUGH: 0
DIARRHEA: 1
CHILLS: 0
DYSURIA: 0
EYE PAIN: 0
VOMITING: 0
SHORTNESS OF BREATH: 0
BACK PAIN: 0
ARTHRALGIAS: 0
PALPITATIONS: 0
NAUSEA: 1
SEIZURES: 0

## 2024-09-12 ASSESSMENT — PAIN SCALES - GENERAL
PAINLEVEL_OUTOF10: 1
PAINLEVEL_OUTOF10: 4
PAINLEVEL_OUTOF10: 4
PAINLEVEL_OUTOF10: 1
PAINLEVEL_OUTOF10: 4
PAINLEVEL_OUTOF10: 1
PAINLEVEL_OUTOF10: 1
PAINLEVEL_OUTOF10: 4
PAINLEVEL_OUTOF10: 4
PAINLEVEL_OUTOF10: 1

## 2024-09-12 ASSESSMENT — PAIN DESCRIPTION - LOCATION: LOCATION: ABDOMEN

## 2024-09-12 ASSESSMENT — PAIN - FUNCTIONAL ASSESSMENT: PAIN_FUNCTIONAL_ASSESSMENT: 0-10

## 2024-09-12 ASSESSMENT — PAIN DESCRIPTION - FREQUENCY: FREQUENCY: CONSTANT/CONTINUOUS

## 2024-09-12 ASSESSMENT — PAIN DESCRIPTION - PAIN TYPE: TYPE: ACUTE PAIN

## 2024-09-12 ASSESSMENT — PAIN DESCRIPTION - DESCRIPTORS
DESCRIPTORS: DULL
DESCRIPTORS: DULL

## 2024-09-12 ASSESSMENT — PAIN DESCRIPTION - ORIENTATION: ORIENTATION: LOWER

## 2024-09-12 ASSESSMENT — COLUMBIA-SUICIDE SEVERITY RATING SCALE - C-SSRS
1. IN THE PAST MONTH, HAVE YOU WISHED YOU WERE DEAD OR WISHED YOU COULD GO TO SLEEP AND NOT WAKE UP?: NO
2. HAVE YOU ACTUALLY HAD ANY THOUGHTS OF KILLING YOURSELF?: NO
6. HAVE YOU EVER DONE ANYTHING, STARTED TO DO ANYTHING, OR PREPARED TO DO ANYTHING TO END YOUR LIFE?: NO

## 2024-09-13 NOTE — ED PROVIDER NOTES
"Patient is a 79-year-old female who presents to the emergency room with a chief complaint of generalized abdominal pain and diarrhea.  Patient reports that she has had diarrhea for approximately 8 years.  She reports associated generalized abdominal pain.  She states that occasionally she is nauseated but currently denies any vomiting.  She states that she has a history of dizziness that has been extensively evaluated in the past and reports that she followed up with her family physician on Monday secondary to a fall.  She states that at the time of seeing her family physician she discussed her chronic diarrhea.  She states that she is here for a second opinion as she does not understand what her family physician is telling her and states that \"he keeps referring me to people.\"  Patient denies any chest pain or shortness of breath.  No fever or chills.  She denies any urinary symptoms.  She denies any blood in her stool or dark or tarry stool.           Review of Systems   Constitutional:  Negative for chills and fever.   HENT:  Negative for ear pain and sore throat.    Eyes:  Negative for pain and visual disturbance.   Respiratory:  Negative for cough, shortness of breath and wheezing.    Cardiovascular:  Negative for chest pain and palpitations.   Gastrointestinal:  Positive for abdominal pain, diarrhea and nausea. Negative for constipation and vomiting.   Genitourinary:  Negative for dysuria and hematuria.   Musculoskeletal:  Negative for arthralgias and back pain.   Skin:  Negative for color change and rash.   Neurological:  Negative for seizures and syncope.   All other systems reviewed and are negative.       Physical Exam  Vitals and nursing note reviewed.   Constitutional:       General: She is not in acute distress.     Appearance: She is well-developed.   HENT:      Head: Normocephalic and atraumatic.   Eyes:      Extraocular Movements: Extraocular movements intact.      Conjunctiva/sclera: Conjunctivae " normal.      Pupils: Pupils are equal, round, and reactive to light.   Cardiovascular:      Rate and Rhythm: Normal rate and regular rhythm.      Heart sounds: No murmur heard.  Pulmonary:      Effort: Pulmonary effort is normal. No respiratory distress.      Breath sounds: Normal breath sounds. No stridor. No wheezing, rhonchi or rales.   Chest:      Chest wall: No tenderness.   Abdominal:      Palpations: Abdomen is soft.      Tenderness: There is generalized abdominal tenderness. There is no guarding or rebound. Negative signs include Teresa's sign and McBurney's sign.      Hernia: No hernia is present.   Musculoskeletal:         General: No swelling.      Cervical back: Neck supple.   Skin:     General: Skin is warm and dry.      Capillary Refill: Capillary refill takes less than 2 seconds.   Neurological:      Mental Status: She is alert.   Psychiatric:         Mood and Affect: Mood normal.          Labs Reviewed   CBC WITH AUTO DIFFERENTIAL - Abnormal       Result Value    WBC 5.9      nRBC 0.0      RBC 3.78 (*)     Hemoglobin 11.3 (*)     Hematocrit 35.1 (*)     MCV 93      MCH 29.9      MCHC 32.2      RDW 13.2      Platelets 136 (*)     Neutrophils % 66.6      Immature Granulocytes %, Automated 0.2      Lymphocytes % 20.8      Monocytes % 7.8      Eosinophils % 4.3      Basophils % 0.3      Neutrophils Absolute 3.90      Immature Granulocytes Absolute, Automated 0.01      Lymphocytes Absolute 1.22      Monocytes Absolute 0.46      Eosinophils Absolute 0.25      Basophils Absolute 0.02     COMPREHENSIVE METABOLIC PANEL - Abnormal    Glucose 93      Sodium 140      Potassium 4.1      Chloride 109 (*)     Bicarbonate 24      Anion Gap 11      Urea Nitrogen 23      Creatinine 1.03      eGFR 55 (*)     Calcium 9.3      Albumin 4.1      Alkaline Phosphatase 114      Total Protein 6.7      AST 17      Bilirubin, Total 0.5      ALT 10     URINALYSIS WITH REFLEX CULTURE AND MICROSCOPIC - Abnormal    Color, Urine  Light-Yellow      Appearance, Urine Clear      Specific Gravity, Urine 1.016      pH, Urine 7.0      Protein, Urine NEGATIVE      Glucose, Urine Normal      Blood, Urine NEGATIVE      Ketones, Urine NEGATIVE      Bilirubin, Urine NEGATIVE      Urobilinogen, Urine Normal      Nitrite, Urine NEGATIVE      Leukocyte Esterase, Urine 250 Chitra/µL (*)    MICROSCOPIC ONLY, URINE - Abnormal    WBC, Urine 6-10 (*)     RBC, Urine 1-2      Squamous Epithelial Cells, Urine 1-9 (SPARSE)     MAGNESIUM - Normal    Magnesium 1.89     APTT - Normal    aPTT 33      Narrative:     The APTT is no longer used for monitoring Unfractionated Heparin Therapy. For monitoring Heparin Therapy, use the Heparin Assay.   PROTIME-INR - Normal    Protime 12.5      INR 1.1     SARS-COV-2 PCR - Normal    Coronavirus 2019, PCR Not Detected      Narrative:     This assay has received FDA Emergency Use Authorization (EUA) and is only authorized for the duration of time that circumstances exist to justify the authorization of the emergency use of in vitro diagnostic tests for the detection of SARS-CoV-2 virus and/or diagnosis of COVID-19 infection under section 564(b)(1) of the Act, 21 U.S.C. 360bbb-3(b)(1). This assay is an in vitro diagnostic nucleic acid amplification test for the qualitative detection of SARS-CoV-2 from nasopharyngeal specimens and has been validated for use at [facility]. Negative results do not preclude COVID-19 infections and should not be used as the sole basis for diagnosis, treatment, or other management decisions.     SERIAL TROPONIN-INITIAL - Normal    Troponin I, High Sensitivity 8      Narrative:     Less than 99th percentile of normal range cutoff-  Female and children under 18 years old <14 ng/L; Male <21 ng/L: Negative  Repeat testing should be performed if clinically indicated.     Female and children under 18 years old 14-50 ng/L; Male 21-50 ng/L:  Consistent with possible cardiac damage and  possible increased clinical   risk. Serial measurements may help to assess extent of myocardial damage.     >50 ng/L: Consistent with cardiac damage, increased clinical risk and  myocardial infarction. Serial measurements may help assess extent of   myocardial damage.      NOTE: Children less than 1 year old may have higher baseline troponin   levels and results should be interpreted in conjunction with the overall   clinical context.     NOTE: Troponin I testing is performed using a different   testing methodology at [facility] than at other   [facility]. Direct result comparisons should only   be made within the same method.   LIPASE - Normal    Lipase 44      Narrative:     Venipuncture immediately after or during the administration of Metamizole may lead to falsely low results. Testing should be performed immediately prior to Metamizole dosing.   SERIAL TROPONIN, 1 HOUR - Normal    Troponin I, High Sensitivity 8      Narrative:     Less than 99th percentile of normal range cutoff-  Female and children under 18 years old <14 ng/L; Male <21 ng/L: Negative  Repeat testing should be performed if clinically indicated.     Female and children under 18 years old 14-50 ng/L; Male 21-50 ng/L:  Consistent with possible cardiac damage and possible increased clinical   risk. Serial measurements may help to assess extent of myocardial damage.     >50 ng/L: Consistent with cardiac damage, increased clinical risk and  myocardial infarction. Serial measurements may help assess extent of   myocardial damage.      NOTE: Children less than 1 year old may have higher baseline troponin   levels and results should be interpreted in conjunction with the overall   clinical context.     NOTE: Troponin I testing is performed using a different   testing methodology at [facility] than at other   [facility]. Direct result comparisons should only   be made within the same method.   URINE CULTURE    TROPONIN SERIES- (INITIAL, 1 HR)    Narrative:     The following orders were created for panel order Troponin I Series, High Sensitivity (0, 1 HR).  Procedure                               Abnormality         Status                     ---------                               -----------         ------                     Troponin I, High Sensiti...[[id number]]  Normal              Final result               Troponin, High Sensitivi...[[id number]]  Normal              Final result                 Please view results for these tests on the individual orders.   URINALYSIS WITH REFLEX CULTURE AND MICROSCOPIC    Narrative:     The following orders were created for panel order Urinalysis with Reflex Culture and Microscopic.  Procedure                               Abnormality         Status                     ---------                               -----------         ------                     Urinalysis with Reflex C...[[id number]]  Abnormal            Final result               Extra Urine Gray Tube[[id number]]                                                         Please view results for these tests on the individual orders.   EXTRA URINE GRAY TUBE        CT abdomen pelvis w IV contrast   Final Result   Age indeterminate moderate central L2 superior endplate compression   fracture with approximately 3 mm retropulsion resulting in   mild-to-moderate canal stenosis.        Portal hypertension without morphologic change of cirrhosis.        No bowel obstruction.        MACRO:   None        Signed by: Betsy Wynn 9/12/2024 8:27 PM   Dictation workstation:   [id number]      XR chest 1 view   Final Result   1. No acute cardiopulmonary process.        MACRO:   None.        Signed by: Rufina Vega 9/12/2024 6:02 PM   Dictation workstation:   [id number]           ECG 12 lead    Performed by: Faviola Asencio PA-C  Authorized by: Faviola Asencio PA-C    ECG interpreted by ED Physician in the absence of a  cardiologist: yes    Comments:      EKG shows AV dual paced rhythm.  With a rate of 75 bpm.  HI interval is 196 ms and QRS duration is 148 ms.  No ST elevation. EKG interpretation per myself, Faviola Asencio       Medical Decision Making  Patient is a 79-year-old female who presents to the emergency room with a chief complaint of chronic diarrhea and abdominal pain.  She is appears very anxious and is rather upset.  She states that her doctor continues to refer her to further physicians.  She states that she has a history of IBS and has not gotten any further explanation for her diarrhea.  Patient states that she is here for second opinion.  She states that she has had worsening abdominal pain.  Workup is unremarkable for any acute process.  It was noted on CT scan that patient has an age-indeterminate L2 compression fracture and also portal hypertension.  She was made aware of these findings and instructed to follow-up with her PCP.  I have referred the patient to gastroenterology as it does not appear that she has seen a gastroenterologist per chart review and per report from patient.    Amount and/or Complexity of Data Reviewed  Labs: ordered. Decision-making details documented in ED Course.  Radiology: ordered. Decision-making details documented in ED Course.  ECG/medicine tests: ordered and independent interpretation performed. Decision-making details documented in ED Course.         Diagnoses as of 09/12/24 2042   Generalized abdominal pain   Diarrhea, unspecified type   Portal hypertension (Multi)   Compression fracture of L2 vertebra, initial encounter (Multi)                    Faviola Asencio PA-C  09/12/24 2042

## 2024-09-14 LAB — BACTERIA UR CULT: ABNORMAL

## 2024-09-15 LAB
ATRIAL RATE: 75 BPM
P AXIS: -63 DEGREES
P OFFSET: 176 MS
P ONSET: 156 MS
PR INTERVAL: 196 MS
Q ONSET: 197 MS
QRS COUNT: 12 BEATS
QRS DURATION: 148 MS
QT INTERVAL: 444 MS
QTC CALCULATION(BAZETT): 495 MS
QTC FREDERICIA: 478 MS
R AXIS: 90 DEGREES
T AXIS: 270 DEGREES
T OFFSET: 419 MS
VENTRICULAR RATE: 75 BPM

## 2024-09-18 ENCOUNTER — TELEPHONE (OUTPATIENT)
Dept: PHARMACY | Facility: HOSPITAL | Age: 79
End: 2024-09-18
Payer: COMMERCIAL

## 2024-09-18 NOTE — PROGRESS NOTES
EDPD Note: Rapid Result Review    Reviewed Mr./Mrs./Ms. Lorna Joaquin 's chart regarding a CONTAMINATED URINE culture/result that was taken during their recent emergency room visit. The patient was not told about these results prior to leaving the emergency department. Therefore, patient was contacted and given proper education.     No results found for the last 90 days.    Patient didn't endorse any urinary symptoms in the ED. Main complaint was of IBS symptoms such as N/V and diarrhea. She was not discharged on any antibiotics. Per patient her symptoms have resolved after changing diet to not aggravate IBS. Patient was made aware of urine results and did not state she had any urinary problems. She plans to see a specialist regarding her IBS.    No further follow up needed from EDPD Team.     GRISELDA CONTRERAS

## 2024-10-10 ENCOUNTER — APPOINTMENT (OUTPATIENT)
Dept: GASTROENTEROLOGY | Facility: CLINIC | Age: 79
End: 2024-10-10
Payer: COMMERCIAL

## 2024-12-09 ENCOUNTER — OFFICE VISIT (OUTPATIENT)
Dept: URGENT CARE | Facility: CLINIC | Age: 79
End: 2024-12-09
Payer: COMMERCIAL

## 2024-12-09 VITALS
TEMPERATURE: 98.6 F | DIASTOLIC BLOOD PRESSURE: 70 MMHG | SYSTOLIC BLOOD PRESSURE: 140 MMHG | RESPIRATION RATE: 16 BRPM | HEIGHT: 64 IN | HEART RATE: 75 BPM | BODY MASS INDEX: 19.46 KG/M2 | OXYGEN SATURATION: 98 % | WEIGHT: 114 LBS

## 2024-12-09 DIAGNOSIS — J06.9 VIRAL URI WITH COUGH: Primary | ICD-10-CM

## 2024-12-09 DIAGNOSIS — J02.9 ACUTE PHARYNGITIS, UNSPECIFIED ETIOLOGY: ICD-10-CM

## 2024-12-09 LAB — POC RAPID STREP: NEGATIVE

## 2024-12-09 PROCEDURE — 87880 STREP A ASSAY W/OPTIC: CPT | Mod: QW | Performed by: PHYSICIAN ASSISTANT

## 2024-12-09 PROCEDURE — 99212 OFFICE O/P EST SF 10 MIN: CPT | Performed by: PHYSICIAN ASSISTANT

## 2024-12-09 NOTE — PATIENT INSTRUCTIONS
Managing Symptoms of Upper Respiratory Infections (URI) for Adults  You can expect the symptoms of your cold or upper respiratory infection to last 14 to 21 days.A dry hacking cough may continue up to three or four weeks. To help you recover:  Drink more fluids.  Get enough rest.  Use a humidifier or increase time in a steamy shower.  Keep in mind that green or yellow secretion do not equal bacterial infection.  Additional recommendations for managing your symptoms:  Fever, headache, or pain  Acetaminophen (Tylenol™) 325 mg 2 tablets every 6 hours as needed for the first 5-7 days of infection.  Acetaminophen (Tylenol™) 500 mg, 2 tablets every 8 hours as needed for the first 5-7 days of infection.   Maximum dose: 3000 mg of acetaminophen in 24 hours.   Avoid combination products that contain acetaminophen (read the label) while taking scheduled  acetaminophen.   Use lowest effective dose for the shortest possible duration to reduce the risk of serious adverse effects.      Sore throat  ? Take acetaminophen as above.  ? Use throat lozenges with benzocaine which help numb your sore throat (Cepacol®, chloraseptic brands).  ? Gargle with saltwater several times a day to help relieve throat pain. Mix 1/4 teaspoon (1.4 grams) of table salt  in 8 ounces (237 milliliters) of warm water. Gargle the solution and then spit it out.    Sinus drainage, sinus/nose/ear congestion  (nose drainage, drainage in the back of the throat, sinus pressure, facial pain, nose stuffiness, ear pressure)  It is common to have nasal drainage of various colors with a viral cold or upper respiratory infection. These usually get  better with time and do not require antibiotics.  ? Saline sinus rinse - Mix and use according to directions on the product (NeilMed©, XClear©).  ? Nasal spray (Flonase®, Nasacort®) - 2 sprays per nostril once a day after a saline sinus irrigation.  ? Oxymetazoline nasal spray (Afrin®, Sinex®)   Take two or three times a day  for 3 days.   Do not use longer than 5 days. After 5 days, use saline nasal spray or the saline sinus rinse  ? Sudafed (pseudoephedrine) capsules - Take every 4 to 6 hours per package instructions for sinus congestion.    Available behind the pharmacy counter.   Avoid if you have high blood pressure, heart disease or take beta blockers (atenolol, metoprolol, etc).   Do not exceed 240 mg per day.   Longer acting medications may have more side effects such restlessness and insomnia.    Cough  Avoid coughing too hard or too often. Excessive coughing may cause bronchial (tubes going to the lungs) irritation which  could cause a cough-irritate-cough cycle that can prolong the cough for weeks.  ? Honey - 1 to 2 teaspoons every 4 to 6 hours as needed.  ? Cough drops every 4 to 6 hours as needed.  ? Guaifenesin/dextromethorphan syrup - (Robitussin DM®) per package instructions.     Do not take if you are taking an antidepressant, opioid pain medication, sleeping medication, or antipsychotic medication.
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
## 2024-12-16 ENCOUNTER — OFFICE VISIT (OUTPATIENT)
Dept: URGENT CARE | Facility: CLINIC | Age: 79
End: 2024-12-16
Payer: COMMERCIAL

## 2024-12-16 VITALS
OXYGEN SATURATION: 98 % | HEART RATE: 75 BPM | SYSTOLIC BLOOD PRESSURE: 138 MMHG | DIASTOLIC BLOOD PRESSURE: 72 MMHG | RESPIRATION RATE: 12 BRPM | TEMPERATURE: 98.2 F

## 2024-12-16 DIAGNOSIS — H61.23 BILATERAL HEARING LOSS DUE TO CERUMEN IMPACTION: ICD-10-CM

## 2024-12-16 DIAGNOSIS — H65.193 ACUTE MUCOID OTITIS MEDIA OF BOTH EARS: Primary | ICD-10-CM

## 2024-12-16 PROCEDURE — 99212 OFFICE O/P EST SF 10 MIN: CPT | Performed by: PHYSICIAN ASSISTANT

## 2024-12-16 PROCEDURE — 69210 REMOVE IMPACTED EAR WAX UNI: CPT | Performed by: PHYSICIAN ASSISTANT

## 2024-12-16 RX ORDER — AMOXICILLIN 500 MG/1
500 CAPSULE ORAL 2 TIMES DAILY
Qty: 20 CAPSULE | Refills: 0 | Status: SHIPPED | OUTPATIENT
Start: 2024-12-16 | End: 2024-12-17
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
## 2024-12-17 DIAGNOSIS — H65.193 ACUTE MUCOID OTITIS MEDIA OF BOTH EARS: ICD-10-CM

## 2024-12-17 RX ORDER — AMOXICILLIN 500 MG/1
CAPSULE ORAL
OUTPATIENT
Start: 2024-12-17

## 2024-12-17 RX ORDER — AMOXICILLIN 500 MG/1
500 CAPSULE ORAL 2 TIMES DAILY
Qty: 20 CAPSULE | Refills: 0 | Status: SHIPPED | OUTPATIENT
Start: 2024-12-17 | End: 2024-12-27

## 2025-01-08 ENCOUNTER — HOSPITAL ENCOUNTER (OUTPATIENT)
Dept: RADIOLOGY | Facility: CLINIC | Age: 80
Discharge: HOME | End: 2025-01-08
Payer: COMMERCIAL

## 2025-01-08 ENCOUNTER — OFFICE VISIT (OUTPATIENT)
Dept: URGENT CARE | Facility: CLINIC | Age: 80
End: 2025-01-08
Payer: COMMERCIAL

## 2025-01-08 VITALS
SYSTOLIC BLOOD PRESSURE: 120 MMHG | DIASTOLIC BLOOD PRESSURE: 69 MMHG | RESPIRATION RATE: 12 BRPM | HEART RATE: 79 BPM | TEMPERATURE: 98.1 F | OXYGEN SATURATION: 99 %

## 2025-01-08 DIAGNOSIS — R05.2 SUBACUTE COUGH: ICD-10-CM

## 2025-01-08 DIAGNOSIS — J40 BRONCHITIS: Primary | ICD-10-CM

## 2025-01-08 PROCEDURE — 99213 OFFICE O/P EST LOW 20 MIN: CPT | Mod: 25 | Performed by: NURSE PRACTITIONER

## 2025-01-08 PROCEDURE — 71046 X-RAY EXAM CHEST 2 VIEWS: CPT | Performed by: RADIOLOGY

## 2025-01-08 PROCEDURE — 71046 X-RAY EXAM CHEST 2 VIEWS: CPT

## 2025-01-08 RX ORDER — PREDNISONE 10 MG/1
30 TABLET ORAL DAILY
Qty: 21 TABLET | Refills: 0 | Status: SHIPPED | OUTPATIENT
Start: 2025-01-08 | End: 2025-01-15

## 2025-01-08 RX ORDER — AMOXICILLIN AND CLAVULANATE POTASSIUM 875; 125 MG/1; MG/1
1 TABLET, FILM COATED ORAL 2 TIMES DAILY
Qty: 20 TABLET | Refills: 0 | Status: SHIPPED | OUTPATIENT
Start: 2025-01-08 | End: 2025-01-18

## 2025-01-08 RX ORDER — BROMPHENIRAMINE MALEATE, PSEUDOEPHEDRINE HYDROCHLORIDE, AND DEXTROMETHORPHAN HYDROBROMIDE 2; 30; 10 MG/5ML; MG/5ML; MG/5ML
5 SYRUP ORAL 4 TIMES DAILY PRN
Qty: 120 ML | Refills: 0 | Status: SHIPPED | OUTPATIENT
Start: 2025-01-08 | End: 2025-01-18

## 2025-01-08 RX ORDER — ALBUTEROL SULFATE 90 UG/1
2 INHALANT RESPIRATORY (INHALATION) EVERY 4 HOURS PRN
Qty: 18 G | Refills: 0 | Status: SHIPPED | OUTPATIENT
Start: 2025-01-08 | End: 2025-02-07
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
